# Patient Record
Sex: FEMALE | NOT HISPANIC OR LATINO | Employment: FULL TIME | URBAN - METROPOLITAN AREA
[De-identification: names, ages, dates, MRNs, and addresses within clinical notes are randomized per-mention and may not be internally consistent; named-entity substitution may affect disease eponyms.]

---

## 2023-11-17 ENCOUNTER — OFFICE VISIT (OUTPATIENT)
Dept: SURGERY | Facility: CLINIC | Age: 33
End: 2023-11-17
Payer: COMMERCIAL

## 2023-11-17 VITALS
DIASTOLIC BLOOD PRESSURE: 76 MMHG | BODY MASS INDEX: 19.24 KG/M2 | HEART RATE: 68 BPM | HEIGHT: 60 IN | TEMPERATURE: 99.2 F | WEIGHT: 98 LBS | SYSTOLIC BLOOD PRESSURE: 109 MMHG

## 2023-11-17 DIAGNOSIS — K64.4 HEMORRHOIDAL SKIN TAG: Primary | ICD-10-CM

## 2023-11-17 PROCEDURE — 99213 OFFICE O/P EST LOW 20 MIN: CPT | Performed by: SURGERY

## 2023-11-17 PROCEDURE — 46600 DIAGNOSTIC ANOSCOPY SPX: CPT | Performed by: SURGERY

## 2023-11-17 PROCEDURE — 1036F TOBACCO NON-USER: CPT | Performed by: SURGERY

## 2023-11-17 PROCEDURE — 99203 OFFICE O/P NEW LOW 30 MIN: CPT | Performed by: SURGERY

## 2023-11-17 RX ORDER — ALBUTEROL SULFATE 90 UG/1
1 AEROSOL, METERED RESPIRATORY (INHALATION)
COMMUNITY
Start: 2022-06-27

## 2023-11-17 RX ORDER — EPINEPHRINE 0.15 MG/.3ML
1 INJECTION INTRAMUSCULAR ONCE
COMMUNITY

## 2023-11-17 ASSESSMENT — ENCOUNTER SYMPTOMS
OCCASIONAL FEELINGS OF UNSTEADINESS: 0
LOSS OF SENSATION IN FEET: 0
DEPRESSION: 0

## 2023-11-17 ASSESSMENT — PATIENT HEALTH QUESTIONNAIRE - PHQ9
1. LITTLE INTEREST OR PLEASURE IN DOING THINGS: SEVERAL DAYS
SUM OF ALL RESPONSES TO PHQ9 QUESTIONS 1 & 2: 2
10. IF YOU CHECKED OFF ANY PROBLEMS, HOW DIFFICULT HAVE THESE PROBLEMS MADE IT FOR YOU TO DO YOUR WORK, TAKE CARE OF THINGS AT HOME, OR GET ALONG WITH OTHER PEOPLE: NOT DIFFICULT AT ALL
2. FEELING DOWN, DEPRESSED OR HOPELESS: SEVERAL DAYS

## 2023-11-17 ASSESSMENT — PAIN SCALES - GENERAL: PAINLEVEL: 2

## 2023-11-17 NOTE — PROGRESS NOTES
[facility]: GENERAL SURGERY  PROGRESS NOTE      Carmen Hussein is a 33 y.o. female that is presenting today for New Patient Visit (Personal ref for external hemorrhoids, 2 years. Itching, burning, bleeding. Patient refused to let me get an accurate weight for today's appointment. Told me she is 98lbs.).    ASSESSMENT / PLAN:  Diagnoses and all orders for this visit:  Hemorrhoidal skin tag  Mild symptoms from external hemorrhoidal skin tag.  Would recommend conservative management but pursue surgical excision should symptoms persist or progress.  Patient Active Problem List   Diagnosis    Hemorrhoidal skin tag     Subjective   , ryder garza  Hemorrhoid on outside, rubs, causes issues, bleeding, present 2 years  No family history crc  Bm TID, constipation and diarrhea  Nonsmoker now  Hx anal fissures as a child  No prior colonscopy   No urgency  Occasional urgency  Review of Systems   All other systems reviewed and are negative.     Objective   Vitals:    11/17/23 1258   BP: 109/76   Pulse: 68   Temp: 37.3 °C (99.2 °F)      Physical Exam  Constitutional:       Appearance: Normal appearance.   HENT:      Head: Normocephalic.   Cardiovascular:      Rate and Rhythm: Normal rate and regular rhythm.      Pulses: Normal pulses.   Pulmonary:      Effort: Pulmonary effort is normal.   Abdominal:      General: Bowel sounds are normal.      Palpations: Abdomen is soft.   Genitourinary:     Comments: With a chaperone in the room the patient was placed in prone Kraske position. Perianal skin was examined without abnormality.  Nonthrombosed but mildly inflamed hemorrhoidal skin tag present in the left posterior quadrant.  All hemorrhoidal skin tag in the anterior midline digital rectal exam revealed normal tone with good squeeze.  No palpable masses appreciated.  Anoscope was inserted with grade 1 internal hemorrhoids.  Musculoskeletal:         General: Normal range of motion.   Skin:     General: Skin  "is warm.   Neurological:      General: No focal deficit present.      Mental Status: She is alert.   Psychiatric:         Mood and Affect: Mood normal.         Behavior: Behavior normal.         Diagnostic Results   Lab Results   Component Value Date    GLUCOSE 87 05/10/2018    CALCIUM 9.4 05/10/2018     05/10/2018    K 4.1 05/10/2018    CO2 24 05/10/2018     05/10/2018    BUN 13 05/10/2018    CREATININE 0.7 05/10/2018     Lab Results   Component Value Date    ALT 14 05/10/2018    AST 15 05/10/2018    ALKPHOS 50 05/10/2018    BILITOT 0.3 05/10/2018     No results found for: \"WBC\", \"HGB\", \"HCT\", \"MCV\", \"PLT\"  Lab Results   Component Value Date    CHOL 179 05/10/2018     Lab Results   Component Value Date     05/10/2018     Lab Results   Component Value Date    LDLCALC 29 (L) 05/10/2018     Lab Results   Component Value Date    TRIG 239 (H) 05/10/2018     No components found for: \"CHOLHDL\"  No results found for: \"HGBA1C\"  Other labs not included in the list above were reviewed either before or during this encounter.    History    Past Medical History:   Diagnosis Date    Asthma     Hemorrhoids     Inguinal hernia bilateral, non-recurrent      Past Surgical History:   Procedure Laterality Date    HERNIA REPAIR  02/26/2015    Hernia Repair Inguinal Bilateral     Family History   Problem Relation Name Age of Onset    Anemia Mother      Hypertension Father      Breast cancer Sister      Alzheimer's disease Paternal Grandmother      Hypertension Paternal Grandmother      Alzheimer's disease Paternal Grandfather      Hypertension Paternal Grandfather      Esophagitis Paternal Grandfather       Allergies   Allergen Reactions    Egg Anaphylaxis and Other    Milk Anaphylaxis and Rash     Current Outpatient Medications on File Prior to Visit   Medication Sig Dispense Refill    EPINEPHrine (EpiPen Jr) 0.15 mg/0.3 mL injection syringe Inject 0.3 mL (0.15 mg) as directed 1 time.      ProAir HFA 90 mcg/actuation " inhaler Inhale 1 puff 3 times a day.       No current facility-administered medications on file prior to visit.       There is no immunization history on file for this patient.  Patient's medical history was reviewed and updated either before or during this encounter.    Giacomo Helms MD